# Patient Record
Sex: MALE | Race: WHITE | Employment: FULL TIME | URBAN - METROPOLITAN AREA
[De-identification: names, ages, dates, MRNs, and addresses within clinical notes are randomized per-mention and may not be internally consistent; named-entity substitution may affect disease eponyms.]

---

## 2022-01-07 ENCOUNTER — HOSPITAL ENCOUNTER (OUTPATIENT)
Facility: CLINIC | Age: 27
Setting detail: OBSERVATION
Discharge: HOME | End: 2022-01-08
Attending: EMERGENCY MEDICINE | Admitting: EMERGENCY MEDICINE
Payer: COMMERCIAL

## 2022-01-07 DIAGNOSIS — Z72.89 SELF-INJURIOUS BEHAVIOR: ICD-10-CM

## 2022-01-07 DIAGNOSIS — F33.0 MILD RECURRENT MAJOR DEPRESSION (H): ICD-10-CM

## 2022-01-07 DIAGNOSIS — F10.929 ALCOHOLIC INTOXICATION WITH COMPLICATION (H): ICD-10-CM

## 2022-01-07 DIAGNOSIS — S61.512A LACERATION OF LEFT WRIST, INITIAL ENCOUNTER: ICD-10-CM

## 2022-01-07 DIAGNOSIS — F10.20 ALCOHOLISM (H): ICD-10-CM

## 2022-01-07 LAB
ALCOHOL BREATH TEST: 0.13 (ref 0–0.01)
SARS-COV-2 RNA RESP QL NAA+PROBE: NEGATIVE

## 2022-01-07 PROCEDURE — 82075 ASSAY OF BREATH ETHANOL: CPT

## 2022-01-07 PROCEDURE — 99285 EMERGENCY DEPT VISIT HI MDM: CPT | Mod: 25

## 2022-01-07 PROCEDURE — 12002 RPR S/N/AX/GEN/TRNK2.6-7.5CM: CPT

## 2022-01-07 PROCEDURE — 90791 PSYCH DIAGNOSTIC EVALUATION: CPT

## 2022-01-07 PROCEDURE — C9803 HOPD COVID-19 SPEC COLLECT: HCPCS

## 2022-01-07 PROCEDURE — 87635 SARS-COV-2 COVID-19 AMP PRB: CPT | Performed by: EMERGENCY MEDICINE

## 2022-01-07 NOTE — Clinical Note
Wes Lee was seen and treated in our emergency department on 1/7/2022.         Sincerely,     [facility] Emergency Dept

## 2022-01-08 VITALS
SYSTOLIC BLOOD PRESSURE: 144 MMHG | DIASTOLIC BLOOD PRESSURE: 81 MMHG | HEART RATE: 57 BPM | TEMPERATURE: 98 F | OXYGEN SATURATION: 98 % | RESPIRATION RATE: 16 BRPM

## 2022-01-08 PROBLEM — F10.929 ALCOHOLIC INTOXICATION WITH COMPLICATION (H): Status: ACTIVE | Noted: 2022-01-08

## 2022-01-08 PROBLEM — Z72.89 SELF-INJURIOUS BEHAVIOR: Status: ACTIVE | Noted: 2022-01-08

## 2022-01-08 PROBLEM — F10.20 ALCOHOLISM (H): Status: ACTIVE | Noted: 2022-01-08

## 2022-01-08 PROBLEM — F33.0 MILD RECURRENT MAJOR DEPRESSION (H): Status: ACTIVE | Noted: 2022-01-08

## 2022-01-08 PROBLEM — S61.512A LACERATION OF LEFT WRIST, INITIAL ENCOUNTER: Status: ACTIVE | Noted: 2022-01-08

## 2022-01-08 PROCEDURE — 250N000013 HC RX MED GY IP 250 OP 250 PS 637: Performed by: PSYCHIATRY & NEUROLOGY

## 2022-01-08 PROCEDURE — G0378 HOSPITAL OBSERVATION PER HR: HCPCS

## 2022-01-08 PROCEDURE — 99236 HOSP IP/OBS SAME DATE HI 85: CPT | Performed by: NURSE PRACTITIONER

## 2022-01-08 RX ORDER — CITALOPRAM HYDROBROMIDE 20 MG/1
40 TABLET ORAL DAILY
Status: DISCONTINUED | OUTPATIENT
Start: 2022-01-08 | End: 2022-01-08 | Stop reason: HOSPADM

## 2022-01-08 RX ORDER — CLONIDINE HYDROCHLORIDE 0.2 MG/1
0.2 TABLET ORAL 2 TIMES DAILY
COMMUNITY

## 2022-01-08 RX ORDER — CLONIDINE HYDROCHLORIDE 0.1 MG/1
0.2 TABLET ORAL 2 TIMES DAILY
Status: DISCONTINUED | OUTPATIENT
Start: 2022-01-08 | End: 2022-01-08

## 2022-01-08 RX ORDER — CITALOPRAM HYDROBROMIDE 20 MG/1
40 TABLET ORAL DAILY
Status: DISCONTINUED | OUTPATIENT
Start: 2022-01-08 | End: 2022-01-08

## 2022-01-08 RX ORDER — CITALOPRAM HYDROBROMIDE 40 MG/1
40 TABLET ORAL DAILY
COMMUNITY

## 2022-01-08 RX ORDER — CLONIDINE HYDROCHLORIDE 0.1 MG/1
0.2 TABLET ORAL 2 TIMES DAILY
Status: DISCONTINUED | OUTPATIENT
Start: 2022-01-08 | End: 2022-01-08 | Stop reason: HOSPADM

## 2022-01-08 RX ORDER — HYDROXYZINE PAMOATE 25 MG/1
25-50 CAPSULE ORAL 3 TIMES DAILY PRN
Qty: 30 CAPSULE | Refills: 0 | Status: SHIPPED | OUTPATIENT
Start: 2022-01-08

## 2022-01-08 RX ORDER — HYDROXYZINE PAMOATE 25 MG/1
25-50 CAPSULE ORAL 3 TIMES DAILY PRN
Qty: 30 CAPSULE | Refills: 0 | Status: SHIPPED | OUTPATIENT
Start: 2022-01-08 | End: 2022-01-08

## 2022-01-08 RX ADMIN — CITALOPRAM HYDROBROMIDE 40 MG: 20 TABLET ORAL at 03:00

## 2022-01-08 RX ADMIN — CLONIDINE HYDROCHLORIDE 0.2 MG: 0.1 TABLET ORAL at 03:00

## 2022-01-08 ASSESSMENT — ACTIVITIES OF DAILY LIVING (ADL)
DRESS: SCRUBS (BEHAVIORAL HEALTH)
ORAL_HYGIENE: INDEPENDENT
HYGIENE/GROOMING: HANDWASHING

## 2022-01-08 NOTE — ED NOTES
emPATH [facility] Reassessment and Progress Note    Client Name: Wes Lee  Date: January 8, 2022       Presenting issue that brought patient to the emPATH unit: intoxication, self-cut on left arm, depression.     Current presentation on the unit: Pt is calm, cooperative and insightful.     Current risk to self or others? No    Summary of therapeutic interventions completed with patient: This writer processed PTA events. Brief therapeutic intervention used to identify triggers for impulsive behaviors. Motivational interviewing techniques used to identify stage of change and confidence/willingness of the pt to engage in recovery. Safety planning completed.     Treatment objectives addressed in this session: Safety and discharge planning. Pt is declining IP admission or detox and would prefer to meet with his established providers to determine IOP/PHP options. Resources included in discharge summary.        Mental Status:     Appearance:   Appropriate    Eye Contact:   Good    Psychomotor Behavior: Normal    Attitude:   Cooperative  Interested Pleasant   Orientation:   All   Speech    Rate / Production: Normal/ Responsive    Volume:  Normal    Mood:    Euthymic   Affect:    Appropriate    Thought Content:  Clear    Thought Form:  Coherent    Insight:    Good       Plan: Pt will discharge to [facility] and go home with safety plan.     Disposition: Individual therapy , Medication management, Programmatic care: Pt is interested in [facility] IOP or PHP but would like his current therapist to help him determine which one is best for him.  and Substance abuse disorder treatment     Rationale for disposition: Pt is well established with current providers and is able to contract for safety.     Reviewed assessment with attending provider: Yes     Total time spent with patient:.75 hrs     CPT code: 29298 - Psychotherapy (with patient) - 45 (38-52*) min      PREETI ZavalaSW                                                      "      If I am feeling unsafe or I am in a crisis, I will:   Contact my established care providers   Call the National Suicide Prevention Lifeline: 270.493.3738   Go to the nearest emergency room   Call 911       Warning signs that I or other people might notice when a crisis is developing for me: If I am isolating.     Things I am able to do on my own to cope or help me feel better: Play music     Things that I am able to do with others to cope or help me better: Online video games, watching favorite shows with Deann     Changes I can make to support my mental health and wellness: Reduce drinking     People in my life that I can ask for help: Deann     [facility] has a mental health crisis team you can call 24/7: The COPE mobile crisis teams can come to where you are. The teams respond to anyone in the [facility] who needs an urgent response. Call [phone].    Other things that are important when I m in crisis: Follow up with outpatient providers     Additional resources and information: [facility] has multiple programs that may suit your needs.      Crisis Lines  Crisis Text Line  Text 927058  You will be connected with a trained live crisis counselor to provide support.    Gambling Hotline  1.845.333.hope [4673]    National Hope Line  1.800.SUICIDE [5834665]    National Suicide Prevention Lifeline  Free and confidential support  1.589.921.TALK [3654]  http://suicidepreventionlifeline.org    The Jer Project (LGBTQ Youth Crisis Line)  4.263.293.1848  text START to 953-478    [place] Crisis Line  3.397.177.6788 (Press 1)  or text 800386    Community Resources  Fast Tracker  Linking people to mental health and substance use disorder resources  fasttrack[employer]ensAble Technologiesn.org     [place] Mental Health Warm Line  Peer to peer support  Monday thru Saturday, 12 pm to 10 pm  403.330.8824 or 1.273.457.3342  Text \"Support\" to 90064    National [place] on Mental Illness (MORALES)  639.251.9518 or " 1.888.MORALES.HELPS    Walk-in Counseling Center  Free mental health counseling  [address] [facility]  [phone]    Mental Health Apps  My3  https://[employer].org/    VirtualHopeBox  https://SeeSaw [employer]org/apps/virtual-hope-box/    Suicide Safety Plan (Biodirection)    Calm Harm

## 2022-01-08 NOTE — TREATMENT PLAN
EmPATH Treatment Plan    Client's Name: Wes Lee  YOB: 1995    DSM-5 Diagnoses:     296.32 (F33.1) Major Depressive Disorder, Recurrent Episode, Moderate _ - by history     Substance-Related & Addictive Disorders Alcohol Use Disorder   305.00 (F10.10) Mild rule out - rule out      Psychosocial / Contextual Factors: Patient presented to the emPATH unit with the following concerns: depression, intoxication, self-harm    Anticipated number of sessions or this episode of care: 1-4    MeasurableTreatment Goal(s) related to diagnosis / functional impairment(s)    Goal 1: Patient will explore motivation for change and barriers faced.    Objective #A     Patient will identify the three main reasons they want to be sober and their three main blocks to recovery.    Status: New as of January 8, 2022    Intervention(s)  LMHP will guide facilitated discussion.      Objective #B  Patient will address feelings of powerlessness associated with substance abuse.    Status: New as of January 8, 2022    Intervention(s)  LMHP will guide facilitated discussion.        Goal 2: Patient will reduce suicidal ideation symptom intensity.    Objective #A     Patient will identify at least 2 stressors that increase suicidal thoughts and develop a coping plan.  Status: New as of January 8, 2022    Intervention(s)  LMHP will provide psychoeducation on coping skills for automatic thoughts and distress tolerance.                Appearance:   Appropriate    Eye Contact:   Good    Psychomotor Behavior: Normal    Attitude:   Cooperative    Orientation:   All   Speech    Rate / Production: Normal     Volume:  Normal    Mood:    Depressed  Sad    Affect:    Flat    Thought Content:  Clear    Thought Form:  Logical    Insight:    Good  and Fair           PLAN:   Patient will board in emPATH until patient and treatment deem it appropriate to either discharge or admit to a higher level of care. Details: if patient wants detox,  collect CBC, CMP, and UDS labs and update CI with clinical.  If not, call CI to remove name from list.  Schedule KERI assessment.       JOSE Concepcion

## 2022-01-08 NOTE — ED NOTES
Bed: [facility]  Expected date: 1/7/22  Expected time: 8:25 PM  Means of arrival: Ambulance  Comments:  HEMS 451 26M suicidal/cutting/intox

## 2022-01-08 NOTE — ED NOTES
Seen by LMHP and Psychiatry. Determined stable to discharge. Discharge instructions reviewed with patient including follow-up care plan. Educated on medication regime and advised not to stop prescribed medication without consulting their physician. Reviewed safety plan and outpatient resources.Denies SI. All belongings which where brought into the hospital have been returned to patient. Escorted off the unit at  12:45 accompanied writer. Discharged to home

## 2022-01-08 NOTE — DISCHARGE INSTRUCTIONS
"If I am feeling unsafe or I am in a crisis, I will:   Contact my established care providers   Call the National Suicide Prevention Lifeline: 397.803.4024   Go to the nearest emergency room   Call 911       Warning signs that I or other people might notice when a crisis is developing for me: If I am isolating.     Things I am able to do on my own to cope or help me feel better: Play music     Things that I am able to do with others to cope or help me better: Online video games, watching favorite shows with Deann     Changes I can make to support my mental health and wellness: Reduce drinking     People in my life that I can ask for help: Deann     [facility] has a mental health crisis team you can call 24/7: The COPE mobile crisis teams can come to where you are. The teams respond to anyone in the [facility] who needs an urgent response. Call [phone].    Other things that are important when I m in crisis: Follow up with outpatient providers     Additional resources and information: [facility] has multiple programs that may suit your needs.      Crisis Lines  Crisis Text Line  Text 093630  You will be connected with a trained live crisis counselor to provide support.    Gambling Hotline  1.579.333.hope [4673]    National Hope Line  1.800.SUICIDE [6856835]    National Suicide Prevention Lifeline  Free and confidential support  1.618.233.TALK [2112]  http://suicidepreventionlifeline.org    The Jer Project (LGBTQ Youth Crisis Line)  7.977.033.2204  text START to 823-244    [place]'s Crisis Line  1.012.899.4495 (Press 1)  or text 456543    Community Resources  Fast Tracker  Linking people to mental health and substance use disorder resources  fasttrackMobile Labsn.org     [place] Mental Health Warm Line  Peer to peer support  Monday thru Saturday, 12 pm to 10 pm  384.587.9304 or 1.572.669.2454  Text \"Support\" to 38870    National [place] on Mental Illness (MORALES)  690.653.4433 or " 1.888.MORALES.HELPS    Walk-in Counseling Center  Free mental health counseling  [address] [facility]  [phone]    Mental Health Apps  My3  https://[employer].org/    VirtualHopeBox  https://[employer]erc[employer]org/apps/virtual-hope-box/    Suicide Safety Plan (Cortrium)    Calm Harm

## 2022-01-08 NOTE — ED PROVIDER NOTES
"  History   Chief Complaint:  Alcohol Intoxication and Laceration     History limited by: Intoxication.    History supplemented by electronic chart review and EMS    Wes Lee is a 26 year old male who presents by EMS with alcohol intoxication and laceration. The patient states he had 6 alcoholic drinks tonight. At one point during the night, he cut his left forearm with a pocket knife. His notified his fiancee who then called EMS and he was brought to the ED. Per EMS, his blood sugar was measured at 99.   He has had 4 to 8 drinks everyday for the past 3 years, and wants to get help, stating \"I have an addiction\". He mentions having a family history of addiction. He has a history of withdrawal tremors, but denies withdrawal seizures. He does not use drugs. He also denies vomiting, diarrhea, fever, or cough. He does not have any allergies to numbing medicines.  No history of formal chemical dependency or psychiatric care.  He states he cut himself \"in the heat of the moment\".    Review of Systems   Unable to perform ROS: Mental status change (Intoxication)     Allergies:  The patient has no known allergies.     Medications:  Patient denies current use of medications.     Past Medical History:     Alcohol withdrawal tremors     Past Surgical History:    Patient denies pertinent past surgical history.     Family History:    Addiction     Social History:  Patient presents to the ED alone via EMS  Hx of alcohol use   Works in IT    Physical Exam     Patient Vitals for the past 24 hrs:   BP Pulse Resp SpO2   01/07/22 2030 (!) 188/106 106 16 99 %     Physical Exam  General: Male sitting upright in BH 1  HENT: mucous membranes moist  Eyes: PERRL without nystagmus  CV: extremities well perfused, regular rhythm, normal left radial pulse, normal cap refill in left fingers  Resp: normal effort, speaks in full phrases, no stridor, no cough observed  GI: abdomen soft and nontender, no guarding  MSK: no bony tenderness "   Skin: appropriately warm and dry, linear somewhat gaping laceration to the left anterior forearm 4.5 cm length  Neuro: alert, talkative but clear speech, oriented, normal tone in extremities, ambulatory with independent gait, no tremors noted  Psych: calm, cooperative, denies feeling suicidal, no evidence of hallucinations    Emergency Department Course   Laboratory:  Labs Ordered and Resulted from Time of ED Arrival to Time of ED Departure   ALCOHOL BREATH TEST POCT - Abnormal       Result Value    Alcohol Breath Test 0.132 (*)    COVID-19 VIRUS (CORONAVIRUS) BY PCR - Normal    SARS CoV2 PCR Negative          Procedures    Procedure Note: Laceration Repair   Performed by: Aristeo Correa MD    Verbal consent given by patient who confirms understanding of the procedure being performed after discussing the risks, benefits and alternatives.    Preparation: Patient was prepped and draped in usual sterile fashion, with assistance from ERT.  Irrigation solution: saline    Body area: L forearm  Laceration length: 4.5 cm  Contamination: The wound is not grossly contaminated.  Foreign bodies: none  Tendon involvement: none  Anesthesia: Local   Local anesthetic: Bupivacaine 0.5% with epinephrine    Debridement: none   Skin closure: Closed with 1 x 4.0 Ethilon   Technique: running  Approximation: close  Approximation difficulty: simple    Patient tolerated the procedure well with no immediate complications.    Emergency Department Course:    Reviewed:  I reviewed nursing notes, vitals, past medical history and Care Everywhere    Assessments:  2032 I obtained history and examined the patient as noted above.   2140 I rechecked the patient and performed a laceration repair.     Disposition:  Care of the patient was transferred to my colleague Dr. Cruz pending sobriety and EmPath.     Impression & Plan   Medical Decision Making:  He presents with self-inflicted laceration to the left forearm which required sutures as  documented above to maintain good hemostasis and wound edge approximation.  No evidence of arterial injury or other more serious trauma.  He readily admits to being an alcoholic and would like help with his mental health.  He is at risk for withdrawal though does not have evidence of such at this time.  No signs or symptoms of other toxidrome, infection, acute neurologic condition, or other process requiring further emergent work-up.  Once clinically sober he will be transferred to [facility] for more detailed mental health assessment.  Sutures should be removed through clinic in 10 to 14 days, and he should be seen sooner in the unexpected event of wound healing concerns.  Care will be signed out to my colleague Dr. Cruz at approximately 0549.    Diagnosis:    ICD-10-CM    1. Alcoholic intoxication with complication (H)  F10.929    2. Self-injurious behavior  Z72.89    3. Laceration of left wrist, initial encounter  S61.512A    4. Alcoholism (H)  F10.20        Scribe Disclosure:  I, Morales Silva, am serving as a scribe at 8:27 PM on 1/7/2022 to document services personally performed by Aristeo Correa MD based on my observations and the provider's statements to me.     This note was completed in part using Dragon voice recognition software. Although reviewed after completion, some word and grammatical errors may occur.         Aristeo Correa MD  01/07/22 9399
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
## 2022-01-08 NOTE — ED TRIAGE NOTES
From home. EMS reports patient has hx depression, was drinking today and called danielle to tell her he needs help with his mental health. When danielle got home, patient had already used a pocket knife and cut his wrist. EMS placed patient on hold. Per EMS, patient cooperative and wants help. VSS en route.

## 2022-01-08 NOTE — PROGRESS NOTES
"26 year old male with history of depression and alcohol abuse received from ED due to alcohol intoxication and laceration to left forearm. Reports he started drinking today, and at one point cut his left forearm with a knife. His girlfriend ended up calling 911 and brought him to the ED. He says he has issues with abusing alcohol, and that he has multiple family members who have histories of substance abuse. He reports he drinks about 4-6 days a week, and will usually have somewhere around 4-8 drinks on those days. He's actively seeking help for his addiction, and is here voluntarily. He sees his therapist on a weekly basis, and has an outpatient psychiatrist. He says he's been taking his Citalopram and Clonidine as prescribed. He's never been to treatment before, or hospitalized IP. He reports he has suicidal thoughts at times, but that the thought of actually doing anything \"terrifies\" him, and he would never do anything. Denies HI.     Nursing and risk assessments completed. Assessments reviewed with LMHP and physician. Video monitoring in progress, patient informed.  Admission information reviewed with patient. Patient given a tour of [facility] and instructions on using the facility. Questions regarding [facility] addressed. Pt search completed and belongings inventoried.    "

## 2022-01-08 NOTE — ED PROVIDER NOTES
I received patient in signout from Dr. Correa.  Please refer to their complete H&P for further information.  Briefly, patient presented acutely intoxicated with laceration that was repaired.  He admitted to suicidal ideations. At time of signout, clinical sobriety pending and then disposition to [facility].     11:43 PM  Patient sent to [facility].  Clinically sober.          Romelia Cruz, DO  01/07/22 2058

## 2022-01-08 NOTE — ED PROVIDER NOTES
[facility] Unit - Psychiatry  Combined Observation Note and Discharge Summary  [facility] Emergency Department  Observation Initiation Date: Jan 7, 2022    Wes Lee MRN: [id number]   Age: 26 year old YOB: 1995     History     Chief Complaint   Patient presents with     Mental Health Problem     HPI  Wes Lee is a 26 year old male with a past history notable for major depressive disorder, anxiety, and alcohol use who presents to the ED intoxicated and suicidal.  He cut his left forearm with a pocket knife and then notified his fiancee.  He was medically cleared, transferred to [facility] for psychiatric assessment and placed on observation status.  There were no concerns overnight.  On examination today, patient recognizes the negative impact consuming alcohol has on his mental health. He denies feeling suicidal yet states he know's he does not want to continuing living life as he is, referring to consuming alcohol up to six days a week, usually to the point of intoxication.  He notes the last week he has been feeling more depressed and hopeless.  He works from home finding he lacks motivation, has been isolating more, experiencing poor appetite and sleep. To compound his depression further, his finance who use to be home with him, started a job on Monday so he is home alone which provides him more opportunity to drink.  He anticipates going home today and being able to confront his addiction now that he has acknowledged he has a problem and wants to seek outpatient supports.      Past Medical History  No past medical history on file.  No past surgical history on file.  citalopram (CELEXA) 40 MG tablet  cloNIDine (CATAPRES) 0.2 MG tablet  hydrOXYzine (VISTARIL) 25 MG capsule      Allergies   Allergen Reactions     Penicillins      Family History  No family history on file.  Social History   Social History     Tobacco Use     Smoking status: Not on file     Smokeless tobacco: Not on file    Substance Use Topics     Alcohol use: Not on file     Drug use: Not on file      Past medical history, past surgical history, medications, allergies, family history, and social history were reviewed with the patient. No additional pertinent items.       Review of Systems  A complete review of systems was performed with pertinent positives and negatives noted in the HPI, and all other systems negative.    Physical Examination   BP: (!) 188/106  Pulse: 106  Temp: 98.9  F (37.2  C)  Resp: 16  SpO2: 99 %    Physical Exam  General: Appears stated age.   Neuro: Alert and fully oriented. Extremities appear to demonstrate normal strength on visual inspection.   Integumentary/Skin: no rash visualized, normal color. Sutures to left forearm from self injuring while intoxicated.    Psychiatric Examination   Appearance: awake, alert  Attitude:  cooperative  Eye Contact:  fair  Mood:  better  Affect:  mood congruent  Speech:  clear, coherent  Psychomotor Behavior:  no evidence of tardive dyskinesia, dystonia, or tics  Thought Process:  logical, linear and goal oriented  Associations:  no loose associations  Thought Content:  no evidence of suicidal ideation or homicidal ideation and no evidence of psychotic thought  Insight:  good  Judgement:  intact  Oriented to:  time, person, and place  Attention Span and Concentration:  intact  Recent and Remote Memory:  intact  Language: able to name/identify objects without impairment  Fund of Knowledge: intact with awareness of current and past events    ED Course        Labs Ordered and Resulted from Time of ED Arrival to Time of ED Departure   ALCOHOL BREATH TEST POCT - Abnormal       Result Value    Alcohol Breath Test 0.132 (*)    COVID-19 VIRUS (CORONAVIRUS) BY PCR - Normal    SARS CoV2 PCR Negative         Assessments & Plan (with Medical Decision Making)   Patient presenting with suicidal ideation, self injurious behavior while intoxicated . He has a history of major depressive  disorder, exacerbated with alcohol use. His sleep is poor. He notes drinking to pass out other wise he has a difficult falling asleep.  He has established outpatient supports and is looking for additional resources and recommendations as he is motivated to make a lifestyle change as it pertains to his alcohol consumption.    Nursing notes reviewed noting no acute issues.     I have reviewed the assessment completed by the [facility].     During the observation period, the patient did not require medications for agitation, and did not require restraints/seclusion for patient and/or provider safety.    The patient was found to have a psychiatric condition that would benefit from an observation stay in the emergency department for further psychiatric stabilization and/or coordination of a safe disposition. The observation plan includes serial assessments of psychiatric condition, potential administration of medications if indicated, further disposition pending the patient's psychiatric course during the monitoring period.     Preliminary diagnosis:    ICD-10-CM    1. Alcoholic intoxication with complication (H)  F10.929    2. Self-injurious behavior  Z72.89    3. Laceration of left wrist, initial encounter  S61.512A    4. Alcoholism (H)  F10.20    5. Mild recurrent major depression (H)  F33.0         Treatment Plan:  -Discharge home as patient is stable, no longer intoxicated or displaying alcohol withdrawal.  -Discussed medications option to optimize the Celexa 40 mg which he takes daily.  He notes he has been taking it for 8-9 months finding it has been helpful, yet may be losing some effectiveness.  We discussed possible augmentation options, such as Wellbutrin or Abilify.  Patient has an established provider and wishes to purse these options at his next appointment on 1/19/22.  -Continue Clonidine 0.2 mg twice a day which is prescribed for anxiety and sleep. He notes fair results from this and appears to be tolerating it  without hypotension.  We discussed alternative PRN antianxiety options. Such as gabapentin or vistaril.  He was interested in trying vistaril as it can also help with sleep, which he notes is a considerable barrier to mental health improvement.  -Vistaril 25-50 mg three times a day as needed for anxiety and sleep.  -Follow up with established outpatient therapist and psychiatrist.  Resources were given from [facility] programs which he probably could benefit from.    After the period in observation care, the patient's circumstances and mental state were safe for outpatient management. After counseling on the diagnosis, work-up, and treatment plan, the patient was discharged. Close follow-up with a psychiatrist and/or therapist was recommended and community psychiatric resources were provided. Patient is to return to the ED if any urgent or potentially life-threatening concerns.      At the time of discharge, the patient's acute suicide risk was determined to be low due to the following factors: Reduction in the intensity of mood/anxiety symptoms that preceded the admission, denial of suicidal thoughts, denies feeling helpless or helpless, not currently under the influence of alcohol or illicit substances, denies experiencing command hallucinations, no immediate access to firearms. The patient's acute risk could be higher if noncompliant with their treatment plan, medications, follow-up appointments or using illicit substances or alcohol. Protective factors include: social supports, children, stable housing, employment, Muslim beliefs, school, expectant mother.    --  CHELSEY Rodrigues CNP   [facility] EMERGENCY DEPT  [facility] Unit  1/7/2022         Geovanna Austin APRN CNP  01/08/22 1219

## 2022-01-08 NOTE — ED NOTES
Pt. reports being tired due to lack of sleep the last 2 nights. Admits to being depressed but denies any suicidal ideation. Reports episode of cutting last night was impulsive with intention of causing injury but not death. Interested in pursuing further treatment for depression and substance abuse- wants outpatient treatment -not interested in Inpatient. Awaiting evaluation by psychiatry.

## 2022-02-06 ENCOUNTER — HEALTH MAINTENANCE LETTER (OUTPATIENT)
Age: 27
End: 2022-02-06

## 2022-10-03 ENCOUNTER — HEALTH MAINTENANCE LETTER (OUTPATIENT)
Age: 27
End: 2022-10-03

## 2023-02-12 ENCOUNTER — HEALTH MAINTENANCE LETTER (OUTPATIENT)
Age: 28
End: 2023-02-12

## 2024-03-09 ENCOUNTER — HEALTH MAINTENANCE LETTER (OUTPATIENT)
Age: 29
End: 2024-03-09